# Patient Record
Sex: MALE | Race: OTHER | HISPANIC OR LATINO | URBAN - METROPOLITAN AREA
[De-identification: names, ages, dates, MRNs, and addresses within clinical notes are randomized per-mention and may not be internally consistent; named-entity substitution may affect disease eponyms.]

---

## 2020-04-01 ENCOUNTER — EMERGENCY (EMERGENCY)
Facility: HOSPITAL | Age: 30
LOS: 0 days | Discharge: HOME | End: 2020-04-01
Attending: EMERGENCY MEDICINE | Admitting: EMERGENCY MEDICINE
Payer: SUBSIDIZED

## 2020-04-01 VITALS
OXYGEN SATURATION: 99 % | HEART RATE: 88 BPM | SYSTOLIC BLOOD PRESSURE: 134 MMHG | DIASTOLIC BLOOD PRESSURE: 65 MMHG | RESPIRATION RATE: 18 BRPM | TEMPERATURE: 98 F

## 2020-04-01 VITALS
HEART RATE: 76 BPM | DIASTOLIC BLOOD PRESSURE: 76 MMHG | RESPIRATION RATE: 18 BRPM | SYSTOLIC BLOOD PRESSURE: 123 MMHG | OXYGEN SATURATION: 99 %

## 2020-04-01 DIAGNOSIS — S00.81XA ABRASION OF OTHER PART OF HEAD, INITIAL ENCOUNTER: ICD-10-CM

## 2020-04-01 DIAGNOSIS — R94.31 ABNORMAL ELECTROCARDIOGRAM [ECG] [EKG]: ICD-10-CM
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
## 2020-04-01 DIAGNOSIS — R55 SYNCOPE AND COLLAPSE: ICD-10-CM
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
## 2020-04-01 DIAGNOSIS — S06.9X0A UNSPECIFIED INTRACRANIAL INJURY WITHOUT LOSS OF CONSCIOUSNESS, INITIAL ENCOUNTER: ICD-10-CM
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
## 2020-04-01 LAB
ALBUMIN SERPL ELPH-MCNC: 5.1 G/DL — SIGNIFICANT CHANGE UP (ref 3.5–5.2)
ALP SERPL-CCNC: 74 U/L — SIGNIFICANT CHANGE UP (ref 30–115)
ALT FLD-CCNC: 23 U/L — SIGNIFICANT CHANGE UP (ref 0–41)
ANION GAP SERPL CALC-SCNC: 11 MMOL/L — SIGNIFICANT CHANGE UP (ref 7–14)
AST SERPL-CCNC: 25 U/L — SIGNIFICANT CHANGE UP (ref 0–41)
BILIRUB SERPL-MCNC: 0.4 MG/DL — SIGNIFICANT CHANGE UP (ref 0.2–1.2)
BUN SERPL-MCNC: 15 MG/DL — SIGNIFICANT CHANGE UP (ref 10–20)
CALCIUM SERPL-MCNC: 9.4 MG/DL — SIGNIFICANT CHANGE UP (ref 8.5–10.1)
CHLORIDE SERPL-SCNC: 105 MMOL/L — SIGNIFICANT CHANGE UP (ref 98–110)
CO2 SERPL-SCNC: 25 MMOL/L — SIGNIFICANT CHANGE UP (ref 17–32)
CREAT SERPL-MCNC: 0.8 MG/DL — SIGNIFICANT CHANGE UP (ref 0.7–1.5)
D DIMER BLD IA.RAPID-MCNC: 107 NG/ML DDU — SIGNIFICANT CHANGE UP (ref 0–230)
GLUCOSE SERPL-MCNC: 145 MG/DL — HIGH (ref 70–99)
HCT VFR BLD CALC: 43.5 % — SIGNIFICANT CHANGE UP (ref 42–52)
HGB BLD-MCNC: 15.2 G/DL — SIGNIFICANT CHANGE UP (ref 14–18)
MAGNESIUM SERPL-MCNC: 2.2 MG/DL — SIGNIFICANT CHANGE UP (ref 1.8–2.4)
MCHC RBC-ENTMCNC: 30.2 PG — SIGNIFICANT CHANGE UP (ref 27–31)
MCHC RBC-ENTMCNC: 34.9 G/DL — SIGNIFICANT CHANGE UP (ref 32–37)
MCV RBC AUTO: 86.5 FL — SIGNIFICANT CHANGE UP (ref 80–94)
NRBC # BLD: 0 /100 WBCS — SIGNIFICANT CHANGE UP (ref 0–0)
PLATELET # BLD AUTO: 108 K/UL — LOW (ref 130–400)
POTASSIUM SERPL-MCNC: 4.5 MMOL/L — SIGNIFICANT CHANGE UP (ref 3.5–5)
POTASSIUM SERPL-SCNC: 4.5 MMOL/L — SIGNIFICANT CHANGE UP (ref 3.5–5)
PROT SERPL-MCNC: 7.4 G/DL — SIGNIFICANT CHANGE UP (ref 6–8)
RBC # BLD: 5.03 M/UL — SIGNIFICANT CHANGE UP (ref 4.7–6.1)
RBC # FLD: 13.2 % — SIGNIFICANT CHANGE UP (ref 11.5–14.5)
SODIUM SERPL-SCNC: 141 MMOL/L — SIGNIFICANT CHANGE UP (ref 135–146)
TROPONIN T SERPL-MCNC: <0.01 NG/ML — SIGNIFICANT CHANGE UP
WBC # BLD: 9.71 K/UL — SIGNIFICANT CHANGE UP (ref 4.8–10.8)
WBC # FLD AUTO: 9.71 K/UL — SIGNIFICANT CHANGE UP (ref 4.8–10.8)

## 2020-04-01 PROCEDURE — 72170 X-RAY EXAM OF PELVIS: CPT | Mod: 26

## 2020-04-01 PROCEDURE — 70486 CT MAXILLOFACIAL W/O DYE: CPT | Mod: 26

## 2020-04-01 PROCEDURE — 93010 ELECTROCARDIOGRAM REPORT: CPT

## 2020-04-01 PROCEDURE — 99285 EMERGENCY DEPT VISIT HI MDM: CPT

## 2020-04-01 PROCEDURE — 72125 CT NECK SPINE W/O DYE: CPT | Mod: 26

## 2020-04-01 PROCEDURE — 71046 X-RAY EXAM CHEST 2 VIEWS: CPT | Mod: 26

## 2020-04-01 PROCEDURE — 70450 CT HEAD/BRAIN W/O DYE: CPT | Mod: 26

## 2020-04-01 RX ORDER — ACETAMINOPHEN 500 MG
650 TABLET ORAL ONCE
Refills: 0 | Status: COMPLETED | OUTPATIENT
Start: 2020-04-01 | End: 2020-04-01

## 2020-04-01 RX ORDER — SODIUM CHLORIDE 9 MG/ML
1000 INJECTION, SOLUTION INTRAVENOUS ONCE
Refills: 0 | Status: COMPLETED | OUTPATIENT
Start: 2020-04-01 | End: 2020-04-01

## 2020-04-01 RX ADMIN — Medication 650 MILLIGRAM(S): at 11:06

## 2020-04-01 RX ADMIN — SODIUM CHLORIDE 1000 MILLILITER(S): 9 INJECTION, SOLUTION INTRAVENOUS at 11:06

## 2020-04-01 NOTE — ED PROVIDER NOTE - PROGRESS NOTE DETAILS
pt aware of all labs and imaging, understands ekg changes, syncope plan for obs, pt refusing, asaox3, competent, does no lack capacity able to recite risks back, reports want to follow up as outpt kaylie due to concern of COVID patients in the hospital, report sno symptoms at this time, tolerating, results reviewed and understood, aware of signs and symptoms to return for, will follow up with p md and cardiology.

## 2020-04-01 NOTE — ED ADULT NURSE NOTE - CHIEF COMPLAINT QUOTE
as per ems he works for [employer] and he slipped and fell hit his head on a piece of wood and went unconscious for a few mins. no anticoagulants. pt fully alert in triage only c/o pain to the cut on his head

## 2020-04-01 NOTE — ED PROVIDER NOTE - NSFOLLOWUPINSTRUCTIONS_ED_ALL_ED_FT
Italian    Síncope  Syncope  Un síncope es cuando justa persona se desvanece (desmaya) ramon un corto tiempo. La causa del síncope es justa disminución súbita del flujo de candelaria al cerebro. Los signos de que alguien está por desmayarse incluyen lo siguiente:  Sentirse mareado o aturdido.Ganas de vomitar (náuseas).Sylvia todo lagunas o trevor.Tener la piel fría y húmeda.Si se desmaya, solicite ayuda de inmediato. Comuníquese con el servicio de emergencias de moore localidad (911 en los [place]). No conduzca por ly propios medios hasta el hospital.  Siga estas indicaciones en moore casa:  Controle si hay algún cambio en ly síntomas. Para mantener moore seguridad y ayudar con los síntomas, tome estas medidas:  Estilo de junior     No conduzca vehículos, no use maquinarias ni practique deportes hasta que el médico lo autorice.No margi alcohol.No consuma ningún producto que contenga nicotina o tabaco, urbano cigarrillos y cigarrillos electrónicos. Si necesita ayuda para dejar de fumar, consulte al médico.Margi suficiente líquido para mantener la orina de color amarillo pálido.Indicaciones generales     Ehrhardt los medicamentos de venta daja y los recetados solamente urbano se lo haya indicado el médico.Si ronna medicamentos para la presión arterial o para el corazón, levántese y siéntese lentamente. Dedique unos minutos a prepararse para sentarse y después levantarse. [place] puede ayudarlo a sentirse menos mareado.Pídale a alguien que se quede con usted hasta que se sienta estable.Si comienza a sentir que podría desmayarse, recuéstese de inmediato y levante (eleve) los pies por encima del nivel del corazón. Respire profundamente y de manera continua. Espere hasta que los síntomas hayan desaparecido.Concurra a todas las visitas de seguimiento urbano se lo haya indicado el médico. [place] es importante.Solicite ayuda de inmediato si:  Tiene un dolor de joaquín muy intenso.Se desmaya justa o más veces.Siente dolor en el pecho, el abdomen o la espalda.Tiene latidos cardíacos muy rápidos o irregulares (palpitaciones).Le duele al respirar.Le sangran la boca o las nalgas (recto).La materia fecal (heces) es gisella o de aspecto alquitranado.Tiene justa crisis de movimientos que no puede controlar (convulsiones).Se siente confundido.Presenta dificultad para caminar.Se siente muy débil.Tiene problemas de visión.Estos síntomas pueden indicar justa emergencia. No espere a sylvia si los síntomas desaparecen. Solicite atención médica de inmediato. Comuníquese con el servicio de emergencias de moore localidad (911 en [place]). No conduzca por ly propios medios hasta el hospital.   Resumen  Un síncope es cuando justa persona se desvanece (desmaya) ramon un corto tiempo. La causa del síncope es justa disminución súbita del flujo de candelaria al cerebro.Los signos de que podría estar por desmayarse incluyen sentirse mareado o aturdido, tener ganas de vomitar, verlo todo lagunas o trevor, o tener la piel fría y húmeda.Si comienza a sentir que podría desmayarse, recuéstese de inmediato y levante (eleve) los pies por encima del nivel del corazón. Respire profundamente y de manera continua. Espere hasta que los síntomas hayan desaparecido.Esta información no tiene urbano fin reemplazar el consejo del médico. Asegúrese de hacerle al médico cualquier pregunta que tenga.      Log Out.    [employer]® CareNotes®     :  [facility]             HEAD INJURY - Discharge Care     Traumatismo craneal    LO QUE NECESITA SABER:    Un traumatismo craneal puede incluir el cuero cabelludo, la charles, el cráneo o el cerebro y puede variar de leve a grave. Los efectos pueden aparecer inmediatamente después de la lesión o desarrollarse más tarde. Los efectos pueden durar poco tiempo o ser permanentes. Es posible que los médicos quieran revisar moore recuperación con el tiempo. El tratamiento puede cambiar a medida que usted se recupera o desarrolla nuevos problemas de coco por el traumatismo craneal.    INSTRUCCIONES SOBRE EL NANCY HOSPITALARIA:    Llame al número local de emergencias (911 en los [place]), o pídale a alguien que llame si:    No es posible despertarlo.      Usted sufre justa convulsión.      Usted jorge de reaccionar cuando le hablan o se desmaya.      Usted tiene visión borrosa o doble.      Usted arrastra las palabras o se confunde al hablar.      Usted tiene debilidad en moore brazo o pierna, pierde la sensación o presenta nuevos problemas de coordinación.      Ly pupilas son más grandes de lo habitual o justa pupila es de tamaño diferente de la otra.      A usted le sale candelaria o un líquido jodi de los oídos o la nariz.    Busque atención médica de inmediato si:    Usted tiene vómitos reiterados o vero.      Se siente confundido.      El dolor de joaquín empeora o se vuelve intenso.      Usted o justa persona que lo cuida nota que le michael más despertarse que de costumbre.    Llame a moore médico si:    Ly síntomas combs más de 6 semanas después de la lesión.      Usted tiene preguntas o inquietudes acerca de moore condición o cuidado.    Medicamentos:    Acetaminofénalivia el dolor y baja la fiebre. Está disponible sin receta médica. Pregunte la cantidad y la frecuencia con que debe tomarlos. Siga las indicaciones. Greer las etiquetas de todos los demás medicamentos que esté usando para saber si también contienen acetaminofén, o pregunte a moore médico o farmacéutico. El acetaminofén puede causar daño en el hígado cuando no se ronna de forma correcta. No use más de 4 gramos (4000 miligramos) en total de acetaminofeno en un día.      Ehrhardt ly medicamentos urbano se le haya indicado.Consulte con moore médico si usted bassam que moore medicamento no le está ayudando o si presenta efectos secundarios. Infórmele si es alérgico a cualquier medicamento. Mantenga justa lista actualizada de los medicamentos, las vitaminas y los productos herbales que ronna. Incluya los siguientes datos de los medicamentos: cantidad, frecuencia y motivo de administración. Traiga con usted la lista o los envases de las píldoras a ly citas de seguimiento. Lleve la lista de los medicamentos con usted en yesenia de justa emergencia.    Cuidados personales:    Descanseo nacho actividades tranquilas. Limite moore tiempo viendo la televisión, utilizando la computadora o realizando tareas que requieren mucho pensamiento. Regrese lentamente ly actividades acostumbradas urbano le indiquen. No practique deportes ni nacho actividades que puedan provocarle un golpe en la joaquín. Pregunte a moore médico cuándo puede regresar al deporte.      Aplique hieloen la joaquín de 15 a 20 minutos cada hora o urbano se le indique. Use justa compresa de hielo o ponga hielo triturado en justa bolsa de plástico. Cúbralo con justa toalla antes de aplicarlo sobre moore piel. El hielo ayuda a evitar daño al tejido y a disminuir la inflamación y el dolor.      Solicite que alguien se quede con usted por 24 horas, o urbano se le indique. Esta persona puede monitorearlo para detectar problemas y pedir ayuda si es necesario. Cuando se despierte, joanna persona debe hacerle algunas preguntas cada pocas horas para sylvia si usted está pensando con claridad. Un ejemplo es preguntarle moore nombre o moore dirección.    Prevenga otro traumatismo craneal:    Use un juan que se ajuste correctamente.Nacho esto cuando practica deportes, o sanjana justa bicicleta, scooter o patineta. Los cascos ayudan a disminuir el riesgo de un traumatismo craneal grave. Hable con moore médico acerca de otras maneras en las que usted puede protegerse si practica deportes.      Use el cinturón de seguridad cada vez que esté en un automóvil.[place] ayuda a disminuir el riesgo de sufrir un traumatismo craneal si tiene un accidente.    Acuda a la consulta de control con moore médico según las indicaciones:Anote ly preguntas para que se acuerde de hacerlas ramon ly visitas.

## 2020-04-01 NOTE — ED PROVIDER NOTE - CARE PROVIDERS DIRECT ADDRESSES
[email or web address]

## 2020-04-01 NOTE — ED PROVIDER NOTE - NS ED ROS FT
Constitutional:  No behavior changes, fevers/chills.    Head: (+) Head trauma, (+) LOC.    Eyes:  No visual changes, eye pain, redness, or discharge; no injury; no foreign body sensation.    ENMT:  No ear pain or discharge, hearing problems; no pain or injuries to the nose, ears, mouth, or throat.    Neck:  No pain, stiffness, injury; no loss of range of motion.    Cardiac: No chest pain, palpitations, diaphoresis.    Chest/respiratory: No cough, wheezing, shortness of breath, chest tightness, or trouble breathing; no injuries.    GI: No nausea, vomiting, diarrhea or abdominal pain; no injuries. No changes in PO intake. No melena/brbpr.    :  No dysuria, hematuria, urgency, or injuries. No changes in urine output. No flank pain.    MS: No pain,  injury, numbness or tingling; no loss of range of motion. No edema. No calf pain/swelling/erythema.    Back:  No pain or injury.    Skin:  No rashes or color changes; no lacerations. (+) Abrasions.  Social: No sick contacts, recent travel .

## 2020-04-01 NOTE — ED PROVIDER NOTE - CARE PLAN
Assessment and plan of treatment:	Plan; EKG, CXR, labs, ivf, pelvic xray, ct head, neck, maxiofacial, Tylenol, pt uts on tetanus, reassess. Principal Discharge DX:	Syncope  Assessment and plan of treatment:	Plan; EKG, CXR, labs, ivf, pelvic xray, ct head, neck, maxiofacial, Tylenol, pt uts on tetanus, reassess.  Secondary Diagnosis:	CHI (closed head injury)  Secondary Diagnosis:	EKG abnormality  Secondary Diagnosis:	Abrasion

## 2020-04-01 NOTE — ED PROVIDER NOTE - OBJECTIVE STATEMENT
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 reporting pain to mandible and head. sustained abrasions to forehead. PT utd on tetanus.     not on any blood thinners. denies fever, chills, n/v, cp, sob, pleuritic chest pain, palpitations, diaphoresis, cough, chest wall/rib pain, blurry vision/visual changes, neck pain/stiffness, back pain, abd pain,, hip pain, weakness, numbness/tingling, LH/dizziness, lacerations, ecchymoses, or swelling. GCS15. 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 pt reports he passed out before he fell. now reporting pain to mandible and head. sustained abrasions to forehead. PT utd on tetanus. no seizure like activity, no tonic clonic activity, no tongue biting, no urinary or bowel incontinence.     not on any blood thinners. denies fever, chills, n/v, cp, sob, pleuritic chest pain, palpitations, diaphoresis, cough, chest wall/rib pain, blurry vision/visual changes, neck pain/stiffness, back pain, abd pain,, hip pain, weakness, numbness/tingling, LH/dizziness, lacerations, ecchymoses, or swelling. GCS15.

## 2020-04-01 NOTE — ED PROVIDER NOTE - PLAN OF CARE
Plan; EKG, CXR, labs, ivf, pelvic xray, ct head, neck, maxiofacial, Tylenol, pt uts on tetanus, reassess.

## 2020-04-01 NOTE — ED PROVIDER NOTE - NSFOLLOWUPCLINICS_GEN_ALL_ED_FT
[facility] [place]  Cardiology  [address] [place] [address]  Phone: [phone]  Fax:   Follow Up Time:     [facility]  Medicine  [address], [place]   Phone: [phone]  Fax:   Follow Up Time:

## 2020-04-01 NOTE — ED PROVIDER NOTE - PATIENT PORTAL LINK FT
You can access the FollowMyHealth Patient Portal offered by [facility] by registering at the following website: [email or web address]. By joining [facility]’s FollowMyHealth portal, you will also be able to view your health information using other applications (apps) compatible with our system.

## 2020-04-01 NOTE — ED PROVIDER NOTE - PHYSICAL EXAMINATION
Vital Signs: I have reviewed the initial vital signs.  Constitutional: WDWN in nad.  HEAD: No signs of basilar skull fracture.  Integumentary: No rash. No lacerations, ecchymoses or swelling. (+) Abrasions to top of forehead.  EYES: No periorbital swelling/ecchymoses. PERRL, EOM intact. No nystagmus.  ENT: MMM. No rhinorrhea/otorrhea. No septal hematoma. No mastoid ecchymoses. b/l mandibular pain to palpation, no malocclusion, no trismus, no elevation or pain to floor of mouth, no drooling/secretions.  NECK: Supple, non-tender, no spinous tenderness to neck. No palpable shelves or step-offs.  BACK: No spinous tenderness. No palpable shelves or step-offs.  Cardiovascular: RRR, radial pulses 2/4 b/l. No pain to palpation to chest wall.  Respiratory: BS present b/l, ctabl, no wheezing or crackles, gno accessory muscle use, no stridor. No pain to palpation to ribs b/l. No crepitus.  Gastrointestinal: BS present throughout all 4 quadrants, soft, nd, nt no rebound tenderness or guarding, no cvat.  Musculoskeletal: FROM, no edema, no hip pain to palpation. No short leg. No internal or external rotation of LE.  Neurologic: GCS 15. AAOx3, motor 5/5 and sensation intact throughout upper and lowe ext, CN II-XII intact, No facial droop or slurring of speech. (-) Pronator (-) Romberg. No dysmteria w. ftn or rapid alternating fine movements. No focal deficits.

## 2020-04-01 NOTE — ED PROVIDER NOTE - CLINICAL SUMMARY MEDICAL DECISION MAKING FREE TEXT BOX
pt gcs 15, feeling better.  The patient wishes to leave against medical advice.  I have discussed the risks, benefits and alternatives (including the possibility of worsening of disease, pain, permanent disability, and/or death) with the patient and his/her family (if available).  The patient voices understanding of these risks, benefits, and alternatives and still wishes to sign out against medical advice.  The patient is awake, alert, oriented  x 3 and has demonstrated capacity to refuse/direct care.  I have advised the patient that they can and should return immediately should they develop any worse/different/additional symptoms, or if they change their mind and want to continue their care.

## 2020-04-01 NOTE — ED ADULT TRIAGE NOTE - CHIEF COMPLAINT QUOTE
as per ems he works for [employer] and he slipped and fell hit his head on a piece of wood and went unconscious for a few mins. no anticoagulants as per ems he works for [email or web address] and he slipped and fell hit his head on a piece of wood and went unconscious for a few mins. no anticoagulants. pt fully alert in triage only c/o pain to the cut on his head

## 2020-04-01 NOTE — ED ADULT NURSE NOTE - OBJECTIVE STATEMENT
pt is a  , fell hit his head on the job, no loc. no distress or bleeding noted. pt is a  , fell hit his head on the job, no loc. no distress or bleeding noted.  pt stated he felt dizzy before he fell.

## 2020-04-01 NOTE — ED PROVIDER NOTE - CARE PROVIDER_API CALL
Shashank Becerra)  Cardiovascular Disease; Internal Medicine; Interventional Cardiology; Nuclear Cardiology  [address] [place] [address]  Phone: [phone]  Fax: [phone]  Follow Up Time:

## 2022-06-01 PROBLEM — Z00.00 ENCOUNTER FOR PREVENTIVE HEALTH EXAMINATION: Status: ACTIVE | Noted: 2022-06-01
